# Patient Record
(demographics unavailable — no encounter records)

---

## 2017-08-23 NOTE — RADRPT
PROCEDURE:   XR Chest. 

 

CLINICAL INDICATION:   Pain.  

 

TECHNIQUE:   Single frontal chest x-ray. 

 

COMPARISON:   None. 

 

FINDINGS:

Heart is enlarged..  There is no congestive heart failure.. No focal infiltrate is seen.  There is n
o pleural effusion.  There is no pneumothorax.  The osseous structures are unremarkable.

 

IMPRESSION:

Cardiomegaly.  No CHF or infiltrate.    

 

 

RPTAT:  [facility]

_____________________________________________ 

.Sebastian Bowles MD, MD           Date    Time 

Electronically viewed and signed by .Sebastian Bowles MD, MD on 08/23/2017 00:15 

 

D:  08/23/2017 00:15  T:  08/23/2017 00:15

.K/

## 2017-08-23 NOTE — RADRPT
PROCEDURE: CT Abdomen and Pelvis without contrast.

 

CLINICAL INDICATION:  Pain.

 

TECHNIQUE:  CT scan of the abdomen and pelvis was performed on a multidetector slice CT scanner. No 
intravenous contrast material was utilized. Sagittal and coronal reformatted images were obtained fr
om the axial source images. Images were reviewed on a high-resolution PACS workstation. Exam CTDlvol
 = 60 mGy and DLP = 969 Gy-cm. One of the following 3 dose reduction techniques were used: Automated
 exposure control; adjustment of the mA and/or kV according to patient size; or use of iterative rec
onstruction technique.

 

COMPARISON:  None.

 

FINDINGS:

 

Small hiatal hernia is present.  There is a small fat containing periumbilical hernia.  There is no 
obstruction or ileus.  The appendix is not identified.  There is no secondary evidence for appendici
tis.. There is distal left and proximal sigmoid colon diverticulosis with associated wall thickening
 and mild surrounding infiltration, consistent with diverticulitis.  There is no free intra peritone
al gas.  There is minimal pelvic free fluid.

 

The liver is overall normal in size. No intrahepatic lesions are identified. The gallbladder has bee
n removed. There is no definite biliary ductal dilation. Pancreas is normal in appearance. There is 
a punctate splenic calcification. The spleen is otherwise unremarkable.. There are no adrenal masses
. The aorta is normal caliber.  Atherosclerotic vascular calcifications are present.

 

Kidneys are normal in appearance without hydronephrosis, mass or calculus.. Ureters are of normal ca
liber and without evidence for an obstructing calculus.  The urinary bladder is partially contracted
...

 

Uterus is not identified.  The ovaries are identified. Right ovary is prominent and 3.9 x 2.3 cm.  L
eft ovary appears prominent at 3.8 x 3.0 cm.

  

Limited evaluation of the lung bases demonstrates mild bibasilar atelectasis.  Heart is enlarged.  T
here is small amount of pericardial fluid.

 

There are degenerative changes of the lumbar spine.

 

IMPRESSION:

 

1.  Distal left and proximal sigmoid colon diverticulosis with wall thickening and mild surrounding 
infiltration compatible with acute diverticulitis.

2.  No bowel obstruction or ileus.

3.  Small hiatal hernia.

4.  Small fat containing periumbilical hernia.

5.  Status post cholecystectomy.

6.  Status post hysterectomy.  Prominent bilateral ovaries.  Recommend correlation with pelvic ultra
sound when clinically appropriate.

7.  Punctate splenic calcification consistent with either a vascular calcification versus granuloma.

8.  Degenerative changes lumbar spine.

9.  Cardiomegaly.

10.  Small amount of pericardial fluid.

11.  Atherosclerotic vascular calcifications

 

 

RPTAT:  [facility]

_____________________________________________ 

.Sebastian Bowles MD, MD           Date    Time 

Electronically viewed and signed by .Sebastian Bowles MD, MD on 08/23/2017 00:26 

 

D:  08/23/2017 00:26  T:  08/23/2017 00:26

.K/
